# Patient Record
Sex: MALE | Race: WHITE | NOT HISPANIC OR LATINO | Employment: FULL TIME | URBAN - METROPOLITAN AREA
[De-identification: names, ages, dates, MRNs, and addresses within clinical notes are randomized per-mention and may not be internally consistent; named-entity substitution may affect disease eponyms.]

---

## 2018-03-09 ENCOUNTER — RADIANT APPOINTMENT (OUTPATIENT)
Dept: GENERAL RADIOLOGY | Facility: CLINIC | Age: 53
End: 2018-03-09
Attending: PHYSICIAN ASSISTANT
Payer: COMMERCIAL

## 2018-03-09 ENCOUNTER — OFFICE VISIT (OUTPATIENT)
Dept: URGENT CARE | Facility: URGENT CARE | Age: 53
End: 2018-03-09
Payer: COMMERCIAL

## 2018-03-09 VITALS
OXYGEN SATURATION: 98 % | DIASTOLIC BLOOD PRESSURE: 80 MMHG | SYSTOLIC BLOOD PRESSURE: 128 MMHG | HEART RATE: 77 BPM | TEMPERATURE: 97.6 F

## 2018-03-09 DIAGNOSIS — S63.501A WRIST SPRAIN, RIGHT, INITIAL ENCOUNTER: Primary | ICD-10-CM

## 2018-03-09 DIAGNOSIS — M25.531 RIGHT WRIST PAIN: ICD-10-CM

## 2018-03-09 PROCEDURE — 99203 OFFICE O/P NEW LOW 30 MIN: CPT | Performed by: PHYSICIAN ASSISTANT

## 2018-03-09 PROCEDURE — 73110 X-RAY EXAM OF WRIST: CPT | Mod: RT

## 2018-03-09 NOTE — PATIENT INSTRUCTIONS
Wrist Sprain  A sprain is an injury to the ligaments or capsule that holds a joint together. There are no broken bones. Most sprains take about 3 to 6 weeks to heal. If it a severe sprain where the ligament is completely torn, it can take months to recover.     Most wrist sprains are treated with a splint, wrist brace, or elastic wrap for support. Severe sprains may require surgery.  Home care    Keep your arm elevated to reduce pain and swelling. This is very important during the first 48 hours.    Apply an ice pack over the injured area for 15 to 20 minutes every 3 to 6 hours. You should do this for the first 24 to 48 hours. You can make an ice pack by filling a plastic bag that seals at the top with ice cubes and then wrapping it with a thin towel. Continue to use ice packs for relief of pain and swelling as needed. As the ice melts, be careful to avoid getting your wrap, splint, or cast wet. After 48 hours, apply heat (warm shower or warm bath) for 15 to 20 minutes several times a day, or alternate ice and heat.     You may use over-the-counter pain medicine to control pain, unless another pain medicine was prescribed. If you have chronic liver or kidney disease or ever had a stomach ulcer or GI bleeding, talk with your doctor before using these medicines.    If you were given a splint or brace, wear it for the time advised by your doctor.  Follow-up care  Follow up with your healthcare provider as advised. Any X-rays you had today don t show any broken bones, breaks, or fractures. Sometimes fractures don t show up on the first X-ray. Bruises and sprains can sometimes hurt as much as a fracture. These injuries can take time to heal completely. If your symptoms don t improve or they get worse, talk with your doctor. You may need a repeat X-ray. If X-rays were taken, you will be told of any new findings that may affect your care.  When to seek medical advice  Call your healthcare provider right away if any of  these occur:    Pain or swelling increases    Fingers or hand becomes cold, blue, numb, or tingly  Date Last Reviewed: 11/20/2015 2000-2017 [employer]. [address] [place] [address], [place] [address]. All rights reserved. This information is not intended as a substitute for professional medical care. Always follow your healthcare professional's instructions.

## 2018-03-09 NOTE — MR AVS SNAPSHOT
After Visit Summary   3/9/2018    Jf Bernardo    MRN: [id number]           Patient Information     Date Of Birth          1965        Visit Information        Provider Department      3/9/2018 4:55 PM Suzanne Weeks PA-C [facility]        Today's Diagnoses     Right wrist pain    -  1      Care Instructions      Wrist Sprain  A sprain is an injury to the ligaments or capsule that holds a joint together. There are no broken bones. Most sprains take about 3 to 6 weeks to heal. If it a severe sprain where the ligament is completely torn, it can take months to recover.     Most wrist sprains are treated with a splint, wrist brace, or elastic wrap for support. Severe sprains may require surgery.  Home care    Keep your arm elevated to reduce pain and swelling. This is very important during the first 48 hours.    Apply an ice pack over the injured area for 15 to 20 minutes every 3 to 6 hours. You should do this for the first 24 to 48 hours. You can make an ice pack by filling a plastic bag that seals at the top with ice cubes and then wrapping it with a thin towel. Continue to use ice packs for relief of pain and swelling as needed. As the ice melts, be careful to avoid getting your wrap, splint, or cast wet. After 48 hours, apply heat (warm shower or warm bath) for 15 to 20 minutes several times a day, or alternate ice and heat.     You may use over-the-counter pain medicine to control pain, unless another pain medicine was prescribed. If you have chronic liver or kidney disease or ever had a stomach ulcer or GI bleeding, talk with your doctor before using these medicines.    If you were given a splint or brace, wear it for the time advised by your doctor.  Follow-up care  Follow up with your healthcare provider as advised. Any X-rays you had today don t show any broken bones, breaks, or fractures. Sometimes fractures don t show up on the first X-ray. Bruises and sprains  "can sometimes hurt as much as a fracture. These injuries can take time to heal completely. If your symptoms don t improve or they get worse, talk with your doctor. You may need a repeat X-ray. If X-rays were taken, you will be told of any new findings that may affect your care.  When to seek medical advice  Call your healthcare provider right away if any of these occur:    Pain or swelling increases    Fingers or hand becomes cold, blue, numb, or tingly  Date Last Reviewed: 11/20/2015 2000-2017 [employer]. [address] [place] [address]. All rights reserved. This information is not intended as a substitute for professional medical care. Always follow your healthcare professional's instructions.                Follow-ups after your visit        Who to contact     If you have questions or need follow up information about today's clinic visit or your schedule please contact [facility] directly at [phone].  Normal or non-critical lab and imaging results will be communicated to you by MyChart, letter or phone within 4 business days after the clinic has received the results. If you do not hear from us within 7 days, please contact the clinic through [employer] or phone. If you have a critical or abnormal lab result, we will notify you by phone as soon as possible.  Submit refill requests through [employer] or call your pharmacy and they will forward the refill request to us. Please allow 3 business days for your refill to be completed.          Additional Information About Your Visit        [employer] Information     [employer] lets you send messages to your doctor, view your test results, renew your prescriptions, schedule appointments and more. To sign up, go to [email or web address] . Click on \"Log in\" on the left side of the screen, which will take you to the Welcome page. Then click on \"Sign up Now\" on the right side of the page.     You will be asked to enter the access code " listed below, as well as some personal information. Please follow the directions to create your username and password.     Your access code is: [email or web address]  Expires: 2018  5:34 PM     Your access code will  in 90 days. If you need help or a new code, please call your [facility] clinic or [phone].        Care EveryWhere ID     This is your Care EveryWhere ID. This could be used by other organizations to access your [facility] medical records  [id number]        Your Vitals Were     Pulse Temperature Pulse Oximetry             77 97.6  F (36.4  C) (Tympanic) 98%          Blood Pressure from Last 3 Encounters:   18 128/80    Weight from Last 3 Encounters:   No data found for Wt               Primary Care Provider Fax #    Physician No Ref-Primary [phone]       No address on file        Equal Access to Services     ZULEYKA ROCHE : Hadii cali barahona Sojane, waaxda luqadaha, qaybta kaalmada loki, kelvin marvin . So [facility] [phone].    ATENCIÓN: Si habla español, tiene a reynolds disposición servicios gratuitos de asistencia lingüística. Llame al [phone].    We comply with applicable federal civil rights laws and [place] laws. We do not discriminate on the basis of race, color, national origin, age, disability, sex, sexual orientation, or gender identity.            Thank you!     Thank you for choosing [facility]  for your care. Our goal is always to provide you with excellent care. Hearing back from our patients is one way we can continue to improve our services. Please take a few minutes to complete the written survey that you may receive in the mail after your visit with us. Thank you!             Your Updated Medication List - Protect others around you: Learn how to safely use, store and throw away your medicines at www.disposemymeds.org.      Notice  As of 3/9/2018  5:34 PM    You have not been prescribed any medications.

## 2018-03-09 NOTE — PROGRESS NOTES
SUBJECTIVE:   Jf Bernardo is a 52 year old male presenting with a chief complaint of   Chief Complaint   Patient presents with     Urgent Care     Musculoskeletal Problem     right wrist injury, slipped, very swollen       He is a new patient of [facility].    Onset of symptoms was today. He fell forward this AM.   Location: right wrist  Context:        The injury happened while while walking      Mechanism: fall       Patient experienced delayed pain, delayed swelling  Course of symptoms is worsening.    Severity moderate  Current and Associated symptoms: Pain and Swelling  Denies  Bruising, Warmth and Redness  Aggravating Factors: flexion/extension  Therapies to improve symptoms include: ice  This is the first time this type of problem has occurred for this patient.     Denies numbness and tingling into his fingers  Denies decrease in strength  Denies color change    Review of Systems   All other systems reviewed and are negative.      No past medical history on file.  No family history on file.  No current outpatient prescriptions on file.     Social History   Substance Use Topics     Smoking status: Not on file     Smokeless tobacco: Not on file     Alcohol use Not on file       OBJECTIVE  /80 (BP Location: Right arm, Patient Position: Chair, Cuff Size: Adult Regular)  Pulse 77  Temp 97.6  F (36.4  C) (Tympanic)  SpO2 98%    Physical Exam   Constitutional: He appears well-developed and well-nourished. No distress.   Musculoskeletal:        Right wrist: He exhibits decreased range of motion, tenderness, bony tenderness and swelling. He exhibits no effusion, no crepitus and no deformity.   CRT brisk   Neurological: He has normal strength. No sensory deficit.   Skin: He is not diaphoretic.       Labs:  No results found for this or any previous visit (from the past 24 hour(s)).    X-Ray was done, my findings are: Normal    ASSESSMENT:      ICD-10-CM    1. Right wrist pain M25.531 XR Wrist Right G/E 3  Views        Medical Decision Making:    Differential Diagnosis:  fracture, muscle strain and contusion      PLAN:    MS Injury/Pain  ice, rest, splint with velcro wrist splint, Tylenol and Ibuprofen    Followup:    If not improving or if condition worsens, follow up with your Primary Care Provider    Suzanne Weeks PA-C

## 2023-03-24 ENCOUNTER — APPOINTMENT (OUTPATIENT)
Dept: CARDIOLOGY | Facility: CLINIC | Age: 58
End: 2023-03-24
Attending: EMERGENCY MEDICINE
Payer: COMMERCIAL

## 2023-03-24 ENCOUNTER — TELEPHONE (OUTPATIENT)
Dept: CARDIOLOGY | Facility: CLINIC | Age: 58
End: 2023-03-24

## 2023-03-24 ENCOUNTER — HOSPITAL ENCOUNTER (EMERGENCY)
Facility: CLINIC | Age: 58
Discharge: HOME | End: 2023-03-24
Attending: EMERGENCY MEDICINE | Admitting: EMERGENCY MEDICINE
Payer: COMMERCIAL

## 2023-03-24 VITALS
SYSTOLIC BLOOD PRESSURE: 109 MMHG | BODY MASS INDEX: 26.94 KG/M2 | TEMPERATURE: 97.1 F | OXYGEN SATURATION: 96 % | WEIGHT: 192.46 LBS | HEART RATE: 74 BPM | DIASTOLIC BLOOD PRESSURE: 95 MMHG | RESPIRATION RATE: 16 BRPM | HEIGHT: 71 IN

## 2023-03-24 DIAGNOSIS — I48.91 ATRIAL FIBRILLATION WITH RVR (H): ICD-10-CM

## 2023-03-24 DIAGNOSIS — I48.91 ATRIAL FIBRILLATION (H): Primary | ICD-10-CM

## 2023-03-24 LAB
ANION GAP SERPL CALCULATED.3IONS-SCNC: 16 MMOL/L (ref 7–15)
ATRIAL RATE - MUSE: 89 BPM
BASOPHILS # BLD AUTO: 0 10E3/UL (ref 0–0.2)
BASOPHILS NFR BLD AUTO: 0 %
BUN SERPL-MCNC: 13.9 MG/DL (ref 6–20)
CALCIUM SERPL-MCNC: 10.2 MG/DL (ref 8.6–10)
CHLORIDE SERPL-SCNC: 99 MMOL/L (ref 98–107)
CREAT SERPL-MCNC: 0.91 MG/DL (ref 0.67–1.17)
DEPRECATED HCO3 PLAS-SCNC: 21 MMOL/L (ref 22–29)
DIASTOLIC BLOOD PRESSURE - MUSE: NORMAL MMHG
EOSINOPHIL # BLD AUTO: 0 10E3/UL (ref 0–0.7)
EOSINOPHIL NFR BLD AUTO: 1 %
ERYTHROCYTE [DISTWIDTH] IN BLOOD BY AUTOMATED COUNT: 11.9 % (ref 10–15)
GFR SERPL CREATININE-BSD FRML MDRD: >90 ML/MIN/1.73M2
GLUCOSE SERPL-MCNC: 142 MG/DL (ref 70–99)
HCT VFR BLD AUTO: 51.4 % (ref 40–53)
HGB BLD-MCNC: 18.4 G/DL (ref 13.3–17.7)
IMM GRANULOCYTES # BLD: 0 10E3/UL
IMM GRANULOCYTES NFR BLD: 0 %
INTERPRETATION ECG - MUSE: NORMAL
LVEF ECHO: NORMAL
LYMPHOCYTES # BLD AUTO: 1.3 10E3/UL (ref 0.8–5.3)
LYMPHOCYTES NFR BLD AUTO: 23 %
MAGNESIUM SERPL-MCNC: 1.8 MG/DL (ref 1.7–2.3)
MCH RBC QN AUTO: 29.5 PG (ref 26.5–33)
MCHC RBC AUTO-ENTMCNC: 35.8 G/DL (ref 31.5–36.5)
MCV RBC AUTO: 82 FL (ref 78–100)
MONOCYTES # BLD AUTO: 0.4 10E3/UL (ref 0–1.3)
MONOCYTES NFR BLD AUTO: 7 %
NEUTROPHILS # BLD AUTO: 3.8 10E3/UL (ref 1.6–8.3)
NEUTROPHILS NFR BLD AUTO: 69 %
NRBC # BLD AUTO: 0 10E3/UL
NRBC BLD AUTO-RTO: 0 /100
P AXIS - MUSE: NORMAL DEGREES
PLATELET # BLD AUTO: 173 10E3/UL (ref 150–450)
POTASSIUM SERPL-SCNC: 4.2 MMOL/L (ref 3.4–5.3)
PR INTERVAL - MUSE: NORMAL MS
QRS DURATION - MUSE: 84 MS
QT - MUSE: 336 MS
QTC - MUSE: 458 MS
R AXIS - MUSE: 19 DEGREES
RBC # BLD AUTO: 6.24 10E6/UL (ref 4.4–5.9)
SODIUM SERPL-SCNC: 136 MMOL/L (ref 136–145)
SYSTOLIC BLOOD PRESSURE - MUSE: NORMAL MMHG
T AXIS - MUSE: 41 DEGREES
TROPONIN T SERPL HS-MCNC: 8 NG/L
TSH SERPL DL<=0.005 MIU/L-ACNC: 1.73 UIU/ML (ref 0.3–4.2)
VENTRICULAR RATE- MUSE: 112 BPM
WBC # BLD AUTO: 5.5 10E3/UL (ref 4–11)

## 2023-03-24 PROCEDURE — 93321 DOPPLER ECHO F-UP/LMTD STD: CPT | Mod: 26 | Performed by: INTERNAL MEDICINE

## 2023-03-24 PROCEDURE — 80048 BASIC METABOLIC PNL TOTAL CA: CPT | Performed by: EMERGENCY MEDICINE

## 2023-03-24 PROCEDURE — 96367 TX/PROPH/DG ADDL SEQ IV INF: CPT

## 2023-03-24 PROCEDURE — 96365 THER/PROPH/DIAG IV INF INIT: CPT

## 2023-03-24 PROCEDURE — 84484 ASSAY OF TROPONIN QUANT: CPT | Performed by: EMERGENCY MEDICINE

## 2023-03-24 PROCEDURE — 96366 THER/PROPH/DIAG IV INF ADDON: CPT | Mod: 59

## 2023-03-24 PROCEDURE — 93325 DOPPLER ECHO COLOR FLOW MAPG: CPT | Mod: 26 | Performed by: INTERNAL MEDICINE

## 2023-03-24 PROCEDURE — 93308 TTE F-UP OR LMTD: CPT

## 2023-03-24 PROCEDURE — 250N000013 HC RX MED GY IP 250 OP 250 PS 637: Performed by: EMERGENCY MEDICINE

## 2023-03-24 PROCEDURE — 99204 OFFICE O/P NEW MOD 45 MIN: CPT | Performed by: INTERNAL MEDICINE

## 2023-03-24 PROCEDURE — 93005 ELECTROCARDIOGRAM TRACING: CPT | Mod: XU

## 2023-03-24 PROCEDURE — 99152 MOD SED SAME PHYS/QHP 5/>YRS: CPT

## 2023-03-24 PROCEDURE — 84443 ASSAY THYROID STIM HORMONE: CPT | Performed by: EMERGENCY MEDICINE

## 2023-03-24 PROCEDURE — 36415 COLL VENOUS BLD VENIPUNCTURE: CPT | Performed by: EMERGENCY MEDICINE

## 2023-03-24 PROCEDURE — 93308 TTE F-UP OR LMTD: CPT | Mod: 26 | Performed by: INTERNAL MEDICINE

## 2023-03-24 PROCEDURE — 250N000011 HC RX IP 250 OP 636: Performed by: EMERGENCY MEDICINE

## 2023-03-24 PROCEDURE — 83735 ASSAY OF MAGNESIUM: CPT | Performed by: EMERGENCY MEDICINE

## 2023-03-24 PROCEDURE — 85025 COMPLETE CBC W/AUTO DIFF WBC: CPT | Performed by: EMERGENCY MEDICINE

## 2023-03-24 PROCEDURE — 93325 DOPPLER ECHO COLOR FLOW MAPG: CPT

## 2023-03-24 PROCEDURE — 96375 TX/PRO/DX INJ NEW DRUG ADDON: CPT

## 2023-03-24 PROCEDURE — 99285 EMERGENCY DEPT VISIT HI MDM: CPT | Mod: 25

## 2023-03-24 PROCEDURE — 92960 CARDIOVERSION ELECTRIC EXT: CPT

## 2023-03-24 RX ORDER — SODIUM CHLORIDE, SODIUM LACTATE, POTASSIUM CHLORIDE, CALCIUM CHLORIDE 600; 310; 30; 20 MG/100ML; MG/100ML; MG/100ML; MG/100ML
INJECTION, SOLUTION INTRAVENOUS ONCE
Status: DISCONTINUED | OUTPATIENT
Start: 2023-03-24 | End: 2023-03-24 | Stop reason: HOSPADM

## 2023-03-24 RX ORDER — PROPOFOL 10 MG/ML
1 INJECTION, EMULSION INTRAVENOUS ONCE
Status: COMPLETED | OUTPATIENT
Start: 2023-03-24 | End: 2023-03-24

## 2023-03-24 RX ORDER — METOPROLOL TARTRATE 25 MG/1
25 TABLET, FILM COATED ORAL ONCE
Status: COMPLETED | OUTPATIENT
Start: 2023-03-24 | End: 2023-03-24

## 2023-03-24 RX ORDER — ASPIRIN 81 MG/1
324 TABLET, CHEWABLE ORAL ONCE
Status: COMPLETED | OUTPATIENT
Start: 2023-03-24 | End: 2023-03-24

## 2023-03-24 RX ORDER — MAGNESIUM SULFATE HEPTAHYDRATE 40 MG/ML
2 INJECTION, SOLUTION INTRAVENOUS ONCE
Status: COMPLETED | OUTPATIENT
Start: 2023-03-24 | End: 2023-03-24

## 2023-03-24 RX ORDER — METOPROLOL TARTRATE 25 MG/1
25 TABLET, FILM COATED ORAL 2 TIMES DAILY
Qty: 60 TABLET | Refills: 0 | Status: SHIPPED | OUTPATIENT
Start: 2023-03-24 | End: 2023-04-17

## 2023-03-24 RX ORDER — DILTIAZEM HYDROCHLORIDE 5 MG/ML
20 INJECTION INTRAVENOUS ONCE
Status: COMPLETED | OUTPATIENT
Start: 2023-03-24 | End: 2023-03-24

## 2023-03-24 RX ADMIN — PROPOFOL 100 MG: 10 INJECTION, EMULSION INTRAVENOUS at 15:42

## 2023-03-24 RX ADMIN — ASPIRIN 81 MG 324 MG: 81 TABLET ORAL at 12:36

## 2023-03-24 RX ADMIN — MAGNESIUM SULFATE HEPTAHYDRATE 2 G: 2 INJECTION, SOLUTION INTRAVENOUS at 13:40

## 2023-03-24 RX ADMIN — DILTIAZEM HYDROCHLORIDE 20 MG: 5 INJECTION, SOLUTION INTRAVENOUS at 12:36

## 2023-03-24 RX ADMIN — METOPROLOL TARTRATE 25 MG: 25 TABLET, FILM COATED ORAL at 12:34

## 2023-03-24 RX ADMIN — AMIODARONE HYDROCHLORIDE 150 MG: 1.5 INJECTION, SOLUTION INTRAVENOUS at 13:18

## 2023-03-24 ASSESSMENT — ACTIVITIES OF DAILY LIVING (ADL)
ADLS_ACUITY_SCORE: 35

## 2023-03-24 ASSESSMENT — ENCOUNTER SYMPTOMS
LIGHT-HEADEDNESS: 1
DIZZINESS: 1
SHORTNESS OF BREATH: 1
DIAPHORESIS: 1
WEAKNESS: 1

## 2023-03-24 NOTE — TELEPHONE ENCOUNTER
Future Appointments   Date Time Provider Department Center   4/17/2023  1:00 PM [facility]  03/24/23 at 2:35 PM

## 2023-03-24 NOTE — ED PROVIDER NOTES
"  History     Chief Complaint:  Atrial Fib       The history is provided by the patient.      Jf Bernardo is a 57 year old male who presents with atrial fibrillation. Patient reports that he [place] way [place] the airport with [place] around 1015. He started feeling lightheaded and dizzy, associated with difficulty breathing, sweating, weakness, and vision change. His wife called EMS where they found he was in Afib and recommended him to come to the ED. Patient has never experienced this before. Patient is otherwise fit and healthy.     Independent Historian:   None - Patient Only     Review of External Notes: none     ROS:  Review of Systems   Constitutional: Positive for diaphoresis.   Eyes: Positive for visual disturbance.   Respiratory: Positive for shortness of breath.    Neurological: Positive for dizziness, weakness and light-headedness.   All other systems reviewed and are negative.    Allergies:  No Known Allergies     Medications:    The patient is not currently taking any prescribed medications.    Past Medical History:    Patient denies past medical history.     Past Surgical History:    Patient denies past surgical history.      Social History:  Presents with wife.  Presents via private vehicle .   PCP: No Ref-Primary, Physician     Physical Exam     Patient Vitals for the past 24 hrs:   BP Temp Temp src Pulse Resp SpO2 Height Weight   03/24/23 1545 (!) 109/95 -- -- 74 16 96 % -- --   03/24/23 1540 115/82 -- -- 68 11 96 % -- --   03/24/23 1535 119/85 -- -- -- 18 -- -- --   03/24/23 1530 108/73 -- -- 92 24 94 % -- --   03/24/23 1525 113/79 -- -- 71 -- -- -- --   03/24/23 1431 -- -- -- -- -- -- 1.803 m (5' 11\") 87.3 kg (192 lb 7.4 oz)   03/24/23 1301 -- -- -- 76 (!) 50 -- -- --   03/24/23 1256 123/77 -- -- 73 (!) 43 -- -- --   03/24/23 1251 109/76 -- -- 65 (!) 6 -- -- --   03/24/23 1246 92/72 -- -- 73 (!) 7 -- -- --   03/24/23 1241 100/78 -- -- 79 11 -- -- --   03/24/23 1236 110/74 -- -- 100 16 -- " -- --   03/24/23 1149 93/72 -- -- -- -- -- -- --   03/24/23 1147 -- 97.1  F (36.2  C) Temporal 108 20 100 % -- --        Physical Exam  Nursing note and vitals reviewed.  HENT:   Mouth/Throat: Moist mucous membranes.   Eyes: EOMI, nonicteric sclera  Cardiovascular: tachycardic, irregularly irregular rhythm, no murmurs, rubs, or gallops  Pulmonary/Chest: Effort normal and breath sounds normal. No respiratory distress. No wheezes. No rales.   Musculoskeletal: Normal range of motion.   Neurological: Alert. Moves all extremities spontaneously.   Skin: Skin is warm and dry. No rash noted.     Emergency Department Course   ECG  ECG taken at 1154, ECG read at 1205  Atrial fibrillation with rapid ventricular response  Abnormal ECG   No prior EKG to compare.   Rate 112 bpm. MD interval * ms. QRS duration 84 ms. QT/QTc 336/458 ms. P-R-T axes * 19 41.     Imaging:  Echocardiogram Limited   Final Result         Report per cardiology    Laboratory:  Labs Ordered and Resulted from Time of ED Arrival to Time of ED Departure   BASIC METABOLIC PANEL - Abnormal       Result Value    Sodium 136      Potassium 4.2      Chloride 99      Carbon Dioxide (CO2) 21 (*)     Anion Gap 16 (*)     Urea Nitrogen 13.9      Creatinine 0.91      Calcium 10.2 (*)     Glucose 142 (*)     GFR Estimate >90     CBC WITH PLATELETS AND DIFFERENTIAL - Abnormal    WBC Count 5.5      RBC Count 6.24 (*)     Hemoglobin 18.4 (*)     Hematocrit 51.4      MCV 82      MCH 29.5      MCHC 35.8      RDW 11.9      Platelet Count 173      % Neutrophils 69      % Lymphocytes 23      % Monocytes 7      % Eosinophils 1      % Basophils 0      % Immature Granulocytes 0      NRBCs per 100 WBC 0      Absolute Neutrophils 3.8      Absolute Lymphocytes 1.3      Absolute Monocytes 0.4      Absolute Eosinophils 0.0      Absolute Basophils 0.0      Absolute Immature Granulocytes 0.0      Absolute NRBCs 0.0     TROPONIN T, HIGH SENSITIVITY - Normal    Troponin T, High Sensitivity 8      TSH WITH FREE T4 REFLEX - Normal    TSH 1.73     MAGNESIUM - Normal    Magnesium 1.8          Procedures   Electrical Cardioversion         Procedure: Electrical Cardioversion        Indication: Atrial Fibrillation     Consent: Written from Patient     Universal Protocol: Universal protocol was followed and time out conducted just prior to starting procedure, confirming patient identity, site/side, procedure, patient position, and availability of correct equipment and implants.       Anesthesia/Sedation: Sedation: The patient was sedated, see separate procedure note for details.      Procedure Detail:   Electrodes were placed: Anterior/posterior  Trial #1: Synchronized Cardioversion at 120 Joules     Cardioversion was successful     Patient Status:  The patient tolerated the procedure well: Yes. There were no complications.        Sedation     Procedure: Procedural Sedation    Sedation Level: Moderate    Indication: Cardioversion    Consent: Written from Patient     Universal protocol: Universal protocol was followed and time out conducted just prior to starting procedure, confirming patient identity, site/side, procedure, patient position, and availability of correct equipment and implants.     Last PO Intake: 5+ hours    ASA Class: Class 1 - A normal healthy patient     Exam:  Mallampati:  Grade 1 - Soft palate, uvula, and pillars visible    Lungs: Clear   Heart: mild tachycardia, irregular rhythm (normal rate at time of cardioversion)    Medication: Propofol  Dose: 100 mg     Monitoring:  Monitoring consisted of heart rate, cardiac, continuous pulse oximetry, frequent blood pressure checks.   There was constant attendance by RN until patient recovered and constant attendance by physician until patient stable.   Intubation and emergency airway equipment available.     Response: Vital signs stable, oxygen saturations greater than 92%       Patient Status: Post procedure patient was alert.     Total Physician  Drug Administration / Monitoring Time: 10 minutes.     Patient was monitored during recovery and returned to pre-procedure baseline.       Emergency Department Course & Assessments:     Interventions:  Medications   lactated ringers BOLUS 500 mL (has no administration in time range)   lactated ringers infusion (has no administration in time range)   diltiazem (CARDIZEM) injection 20 mg (20 mg Intravenous $Given 3/24/23 1236)   metoprolol tartrate (LOPRESSOR) tablet 25 mg (25 mg Oral $Given 3/24/23 1234)   aspirin (ASA) chewable tablet 324 mg (324 mg Oral $Given 3/24/23 1236)   amiodarone (NEXTERONE) bolus 150 mg (0 mg Intravenous Stopped 3/24/23 1334)   magnesium sulfate 2 g in 50 mL sterile water intermittent infusion (0 g Intravenous Stopped 3/24/23 1619)   propofol (DIPRIVAN) injection 10 mg/mL vial (100 mg Intravenous $Given 3/24/23 1542)        Independent Interpretation (X-rays, CTs, rhythm strip):  None    Assessments and Consultations/Discussion of Management or Tests:  ED Course as of 03/24/23 1641   Fri Mar 24, 2023   1211 I examined and obtained patient's history.    1231 I consulted with Dr. Ap Weldon, Cardiology, about patient's history.    1535 I performed cardioversion on patient.        Social Determinants of Health affecting care:   None    Disposition:  The patient was discharged to home.     Impression & Plan        Medical Decision Making:  Pt presents for evaluation of palpitations, onset earlier this AM with very clear onset.  This is consistent with atrial fibrillation with rapid ventricular response.  Based on acute symptoms less than 48 hours, very clear story, good historian and after obtaining informed consent, electrical cardioversion was successful in converting rhythm back to normal sinus.  Did discuss case with cardiology, Dr. Weldon, who did see patient and agreed with cardioversion.  Also obtained a stat echo which was reassuring.  I doubt acute coronary syndrome, thyroid  issues, PE, dissection, drug ingestion, acute electrolyte imbalance, etc.  Labs ok. Repeat EKG looks excellent. He is asymptomatic after cardioversion now and would not hospitalize. Discussed with patient and he is in agreement. Will start metoprolol and eliquis x 1 month per cardiology instructions. He has an appt scheduled in 3 weeks. He is in stable condition at the time of discharge, indications for return to the ED were discussed as well as follow up. All questions were answered and he and his wife are in agreement with the plan.        Diagnosis:    ICD-10-CM    1. Atrial fibrillation with RVR (H)  I48.91            Discharge Medications:  New Prescriptions    APIXABAN ANTICOAGULANT (ELIQUIS ANTICOAGULANT) 5 MG TABLET    Take 1 tablet (5 mg) by mouth 2 times daily for 30 days    METOPROLOL TARTRATE (LOPRESSOR) 25 MG TABLET    Take 1 tablet (25 mg) by mouth 2 times daily for 30 days          Scribe Disclosure:  Kelsey SIDDIQUI, am serving as a scribe at 12:06 PM on 3/24/2023 to document services personally performed by Nickolas Blackwood MD based on my observations and the provider's statements to me.        Nickolas Blackwood MD  03/25/23 1790

## 2023-03-24 NOTE — PROGRESS NOTES
"         [facility]  Respiratory Care Note    An ETCO2 monitor was placed on the pt with 3 LPM bled in. The Ambu bag, suction, and airways were setup and present in the room, but not needed. Pt was able to maintain airway throughout the procedure with no intervention needed ETCO2 levels were maintained between 34-38, HR 85-67, RR 27-25. Will continue to monitor and assess the pt's current respiratory status and needs.     Vital signs:  Temp: 97.1  F (36.2  C) Temp src: Temporal BP: (!) 109/95 Pulse: 74   Resp: 16 SpO2: 96 % O2 Device: None (Room air) Oxygen Delivery: 2 LPM Height: 180.3 cm (5' 11\") Weight: 87.3 kg (192 lb 7.4 oz)  Estimated body mass index is 26.84 kg/m  as calculated from the following:    Height as of this encounter: 1.803 m (5' 11\").    Weight as of this encounter: 87.3 kg (192 lb 7.4 oz).      Faisal Machuca RT  [facility]  3/24/2023      "

## 2023-03-24 NOTE — ED NOTES
Cardiac (Adult) Cardiac WDL:  (pt comes in with chest pressure, fluttering feeling in chest, diaphoretic, SOB)

## 2023-03-24 NOTE — TELEPHONE ENCOUNTER
----- Message from Darryl Weldon MD sent at 3/24/2023  1:52 PM CDT -----  Regarding: ED follow up  I saw this patient in the ER for A-fib, he is undergoing a cardioversion and should be discharged home.  Can you arrange follow-up in about 3 to 4 weeks?    Ap

## 2023-03-24 NOTE — LETTER
March 24, 2023      To Whom It May Concern:      Jf Bernardo was seen in our Emergency Department today, 03/24/23. Pt was suffering a medical emergency that caused him to miss/change his flight. He is now safe to fly.     Sincerely,        Nickolas Blackwood MD

## 2023-03-24 NOTE — ED TRIAGE NOTES
x1 hour. Dizzy, covered in sweat, weakness. Called EMS. They stated he was in A-Fib, which is new for him.

## 2023-03-24 NOTE — CONSULTS
[facility]    CARDIOLOGY CONSULT    Date of Admission:  3/24/2023  Date of Consult: March 24, 2023    ASSESSMENT:  57-year-old male seen for new onset atrial fibrillation.  Symptoms started at 10 AM this morning, 2 hours before ED presentation.  Troponin is negative, EKG showed no ischemic changes.  There is no obvious provoking factor for his A-fib.  We talked about the treatment options.  Since his onset was only a few hours ago, he is agreeable to a cardioversion.  This will be done in the emergency department.  He has already been given a dose of IV amiodarone and metoprolol.    He should have at least 30 days of anticoagulation after cardioversion, even though his [id number] score is very low.    RECOMMENDATIONS:  1.  New onset A-fib  -Recommend cardioversion in the ED, discharged home if stable afterward  -Recommend discharge with Eliquis and metoprolol    Will arrange cardiology follow-up in 3 to 4 weeks.    Darryl Weldon MD  Cardiology - [facility]  Pager:  [phone]  Text Page  March 24, 2023    CODE STATUS:  No Order    REASON FOR CONSULT: afib    PRIMARY CARE PHYSICIAN:  Physician No Ref-Primary    HISTORY OF PRESENT ILLNESS:  57 year old male seen for afib.  He has no cardiac history.    At baseline he is quite active.  He does regular exercise and denies any shortness of breath, dyspnea, or other symptoms.  He denies any palpitations.    Around 10 AM this morning, he had onset of palpitations, mild chest discomfort, and felt a little diaphoretic.  He presented to the ED.  He was found to be in A-fib with heart rate of 110.  EKG showed no ischemic changes.  He had no ongoing chest pain.    At the time of interview he is still in rate controlled A-fib with heart rate of 70.  He is resting very comfortably in bed.  He otherwise denies any recent illnesses.  He denies any drug use or other substances.    PAST MEDICAL HISTORY:  No significant past medical history    HOME  MEDICATIONS:  Prior to Admission Medications   Prescriptions Last Dose Informant Patient Reported? Taking?   order for DME   No No   Sig: Equipment being ordered: wrist splint      Facility-Administered Medications: None       ALLERGIES:  No Known Allergies    SOCIAL HISTORY:  Social alcohol, non-smoker.    FAMILY HISTORY:  No premature CAD.    REVIEW OF SYSTEMS:  Constitutional:  No weight loss, fever, chills  HEENT:  Eyes:  No visual loss, blurred vision, double vision or yellow sclerae. No hearing loss, sneezing, congestion, runny nose or sore throat.  Skin:  No rash or itching.  Cardiovascular: per HPI  Respiratory: per HPI  GI:  No anorexia, nausea, vomiting or diarrhea. No abdominal pain or blood.  :  No dysurea, hematuria  Neurologic:  No headache, paralysis, ataxia, numbness or tingling in the extremities. No change in bowel or bladder control.  Musculoskeletal:  No muscle pain  Hematologic:  No bleeding or bruising.  Lymphatics:  No enlarged nodes. No history of splenectomy.  Endocrine:  No reports of sweating, cold or heat intolerance. No polyuria or polydipsia.  Allergies:  No history of asthma, hives, eczema or rhinitis.    PHYSICAL EXAM:  Temp: 97.1  F (36.2  C) Temp src: Temporal BP: 93/72 Pulse: 108   Resp: 20 SpO2: 100 %      Vital Signs with Ranges  Temp:  [97.1  F (36.2  C)] 97.1  F (36.2  C)  Pulse:  [108] 108  Resp:  [20] 20  BP: (93)/(72) 93/72  SpO2:  [100 %] 100 %  0 lbs 0 oz    Constitutional: awake, alert, no distress  Eyes: PERRL, sclera nonicteric  ENT: trachea midline  Respiratory: Lungs clear  Cardiovascular: Regular rate, irregular rhythm, no murmurs  GI: nondistended, nontender, bowel sounds present  Lymph/Hematologic: no lymphadenopathy  Skin: dry, no rash  Musculoskeletal: good muscle tone, strength 5/5 in upper and lower extremities  Neurologic: no focal deficits  Neuropsychiatric: appropriate affact    No intake or output data in the 24 hours ending 03/24/23 1240    Clinically  Significant Risk Factors Present on Admission                    Cardiovascular: Cardiac Arrhythmia: Atrial fibrillation: Paroxysmal    DATA:  Labs: WBC 5, hemoglobin 18, platelets 173, potassium 4.2, creatinine 0.9, troponin 8, TSH 1.7    EKG: March 24: A-fib, rate 112, no ST elevation or depression    Tele: A-fib, rate 70s    Echo: March 24: EF 60%, normal wall motion, no significant valve disease

## 2023-03-27 LAB
ATRIAL RATE - MUSE: 78 BPM
DIASTOLIC BLOOD PRESSURE - MUSE: NORMAL MMHG
INTERPRETATION ECG - MUSE: NORMAL
P AXIS - MUSE: 21 DEGREES
PR INTERVAL - MUSE: 140 MS
QRS DURATION - MUSE: 80 MS
QT - MUSE: 382 MS
QTC - MUSE: 435 MS
R AXIS - MUSE: -29 DEGREES
SYSTOLIC BLOOD PRESSURE - MUSE: NORMAL MMHG
T AXIS - MUSE: 13 DEGREES
VENTRICULAR RATE- MUSE: 78 BPM

## 2023-04-17 ENCOUNTER — OFFICE VISIT (OUTPATIENT)
Dept: CARDIOLOGY | Facility: CLINIC | Age: 58
End: 2023-04-17
Payer: COMMERCIAL

## 2023-04-17 VITALS
HEIGHT: 71 IN | DIASTOLIC BLOOD PRESSURE: 80 MMHG | BODY MASS INDEX: 26.74 KG/M2 | HEART RATE: 72 BPM | WEIGHT: 191 LBS | SYSTOLIC BLOOD PRESSURE: 130 MMHG

## 2023-04-17 DIAGNOSIS — I48.91 ATRIAL FIBRILLATION, UNSPECIFIED TYPE (H): ICD-10-CM

## 2023-04-17 DIAGNOSIS — Z13.6 ENCOUNTER FOR SCREENING FOR CARDIOVASCULAR DISORDERS: Primary | ICD-10-CM

## 2023-04-17 PROCEDURE — 93000 ELECTROCARDIOGRAM COMPLETE: CPT | Performed by: PHYSICIAN ASSISTANT

## 2023-04-17 PROCEDURE — 99214 OFFICE O/P EST MOD 30 MIN: CPT | Performed by: PHYSICIAN ASSISTANT

## 2023-04-17 NOTE — PATIENT INSTRUCTIONS
Visit Summary:    Today we discussed:     Medication changes:    STOP the metoprolol  FINISH your current prescription for Eliquis then stop that as well.    Continue to avoid excess caffeine or alcohol.  You may resume your usual activity.     Follow up:   With Dr. Weldon in 3 months, but please call sooner with any new concerns.     Please call my nurse Cieol at [phone] with any questions or concerns.

## 2023-04-17 NOTE — PROGRESS NOTES
Cardiology Progress Note    Date of Service: 04/17/23      Reason for visit: ER follow up, new onset atrial fibrillation.    Primary cardiologist: Dr. Darryl Weldon      HPI:  Mr. 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 He was seen in the ER a few weeks ago after abruptly developing developing dizziness, dyspnea, sweating, and visual changes while on his way to the airport with his wife. EMS was called and he was found to have new onset atrial fibrillation (initially rapid with a HR of 112). Troponin was negative with no ischemic EKG changes and no obvious provoking factor for his afib. He was given a dose of IV amiodarone and metoprolol, and underwent successful cardioversion back to sinus rhythm while in the ER. He was discharged home on metoprolol and Eliquis.    Today, I am meeting Jf in clinic for follow up. He tells me that since discharge, he has not had recurrence of these symptoms; however, he reports overall feeling very poorly on the metoprolol. After taking it, for the next hour or two, he gets sweaty and significantly tired to the point where he ends up mostly sitting around, which is understandably quite distressing to him, as he very much loves to run. He denies any new chest pain, palpitations, or LE edema.     Most recent labs performed were reviewed as below.        ASSESSMENT/PLAN:    1. New diagnosis atrial fibrillation.   --As outlined above, developed abrupt onset of new symptoms, and found to be in afib with RVR. Unknown precipitating factor, though upon further discussion he reports having a lot more caffeine than usual as he felt stressed/fatigue while not being able to run recently due to a prior leg injury. He was successfully cardioverted back to sinus rhythm in the ER and discharged home. Currently, he remains in sinus rhythm per EKG with a HR of 65.    --Will stop the metoprolol today; this  "sounds to be causing him a lot of fatigue and regular exertion intolerance significantly less than he is used to. His qualify of life very much revolves around his ability to return to his long distance running. I told him he may slowly ease back into his usual activities and monitor for any recurrence of symptoms. Advised he continue to avoid excess caffeine intake.   --Will have him complete his 30 day prescription for Eliquis and then he may discontinue. [id number] score is zero.     Follow up plan: Return in 3 months for follow up in clinic, but I asked him to return sooner if new concerns/recurrence of symptoms.       Orders this Visit:  Orders Placed This Encounter   Procedures     Follow-Up with Cardiology     EKG 12-lead complete w/read - Clinics (performed today)     No orders of the defined types were placed in this encounter.    Medications Discontinued During This Encounter   Medication Reason     metoprolol tartrate (LOPRESSOR) 25 MG tablet Stop at Discharge     apixaban ANTICOAGULANT (ELIQUIS ANTICOAGULANT) 5 MG tablet Therapy completed (No AVS)           CURRENT MEDICATIONS:  Current Outpatient Medications   Medication Sig Dispense Refill     order for DME Equipment being ordered: wrist splint 1 Device 0       ALLERGIES   No Known Allergies    PAST MEDICAL HISTORY:  No past medical history on file.      Review of Systems:  POS ROS ARE BOLDED, all other negative.    Cardiovascular: Chest pain, palpitations, orthopnea, LE edema  Constitutional: New chills, fevers. Fatigue/sweaty.  Resp: Dyspnea at rest, dyspnea on exertion, known chronic lung disease  Hematologic/lymphatic: Current systemic anticoagulation, hx of blood clots, new bleeding concerns.  Neurological: New seizures, syncope/presyncope     Physical Exam:  Vitals: /80   Pulse 72   Ht 1.803 m (5' 11\")   Wt 86.6 kg (191 lb)   BMI 26.64 kg/m     Wt Readings from Last 4 Encounters:   04/17/23 86.6 kg (191 lb)   03/24/23 87.3 kg (192 lb " 7.4 oz)       GEN:  In general, this is a well nourished male in no acute distress on RA.  Patient ambulatory, accompanied by his wife.  C/V:  Regular rate and rhythm, no murmur, rub or gallop. No S3 or RV heave.   RESP: Respirations are unlabored. No use of accessory muscles. Clear to auscultation bilaterally without wheezing, rales, or rhonchi.  EXTREM: No LE edema.   NEURO: Alert and oriented, cooperative. Gait not assessed.     Recent Lab Results:    CBC RESULTS:  Lab Results   Component Value Date    WBC 5.5 03/24/2023    RBC 6.24 (H) 03/24/2023    HGB 18.4 (H) 03/24/2023    HCT 51.4 03/24/2023    MCV 82 03/24/2023    MCH 29.5 03/24/2023    MCHC 35.8 03/24/2023    RDW 11.9 03/24/2023     03/24/2023       BMP RESULTS:  Lab Results   Component Value Date     03/24/2023    POTASSIUM 4.2 03/24/2023    CHLORIDE 99 03/24/2023    CO2 21 (L) 03/24/2023    ANIONGAP 16 (H) 03/24/2023     (H) 03/24/2023    BUN 13.9 03/24/2023    CR 0.91 03/24/2023    GFRESTIMATED >90 03/24/2023    DMITRIY 10.2 (H) 03/24/2023        Additional pertinent testing:    Echo 3/24/23  Interpretation Summary     Left ventricular systolic function is normal. The visual ejection fraction is  60-65%.  The right ventricle is normal in structure, function and size.  There is trace tricuspid regurgitation. There is a small filamentous mobile  echodensity below the tricuspid valve, suspect torn chordae tendineae (image  18).  Trivial anterior pericardial effusion. There are no echocardiographic  indications of cardiac tamponade/hemodynamic significance.  The inferior vena cava was normal in size with preserved respiratory  variability.     There are no prior studies available for comparison      30 total minutes was spent today including chart review, precharting, history and exam, patient education, post visit documentation, and reviewing studies as outlined above.       Janel Oliveira PA-C  [facility]  Pager [phone]

## 2023-04-17 NOTE — LETTER
4/17/2023    Physician No Ref-Primary  No address on file    RE: Jf Bernardo       Dear Colleague,     I had the pleasure of seeing Jf Bernardo in the [facility].        Cardiology Progress Note    Date of Service: 04/17/23      Reason for visit: ER follow up, new onset atrial fibrillation.    Primary cardiologist: Dr. Darryl Weldon      HPI:  Mr. 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 He was seen in the ER a few weeks ago after abruptly developing developing dizziness, dyspnea, sweating, and visual changes while on his way to the airport with his wife. EMS was called and he was found to have new onset atrial fibrillation (initially rapid with a HR of 112). Troponin was negative with no ischemic EKG changes and no obvious provoking factor for his afib. He was given a dose of IV amiodarone and metoprolol, and underwent successful cardioversion back to sinus rhythm while in the ER. He was discharged home on metoprolol and Eliquis.    Today, I am meeting Jf in clinic for follow up. He tells me that since discharge, he has not had recurrence of these symptoms; however, he reports overall feeling very poorly on the metoprolol. After taking it, for the next hour or two, he gets sweaty and significantly tired to the point where he ends up mostly sitting around, which is understandably quite distressing to him, as he very much loves to run. He denies any new chest pain, palpitations, or LE edema.     Most recent labs performed were reviewed as below.        ASSESSMENT/PLAN:    1. New diagnosis atrial fibrillation.   --As outlined above, developed abrupt onset of new symptoms, and found to be in afib with RVR. Unknown precipitating factor, though upon further discussion he reports having a lot more caffeine than usual as he felt stressed/fatigue while not being able to run recently due to a prior leg  injury. He was successfully cardioverted back to sinus rhythm in the ER and discharged home. Currently, he remains in sinus rhythm per EKG with a HR of 65.    --Will stop the metoprolol today; this sounds to be causing him a lot of fatigue and regular exertion intolerance significantly less than he is used to. His qualify of life very much revolves around his ability to return to his long distance running. I told him he may slowly ease back into his usual activities and monitor for any recurrence of symptoms. Advised he continue to avoid excess caffeine intake.   --Will have him complete his 30 day prescription for Eliquis and then he may discontinue. [id number] score is zero.     Follow up plan: Return in 3 months for follow up in clinic, but I asked him to return sooner if new concerns/recurrence of symptoms.       Orders this Visit:  Orders Placed This Encounter   Procedures    Follow-Up with Cardiology    EKG 12-lead complete w/read - Clinics (performed today)     No orders of the defined types were placed in this encounter.    Medications Discontinued During This Encounter   Medication Reason    metoprolol tartrate (LOPRESSOR) 25 MG tablet Stop at Discharge    apixaban ANTICOAGULANT (ELIQUIS ANTICOAGULANT) 5 MG tablet Therapy completed (No AVS)           CURRENT MEDICATIONS:  Current Outpatient Medications   Medication Sig Dispense Refill    order for DME Equipment being ordered: wrist splint 1 Device 0       ALLERGIES   No Known Allergies    PAST MEDICAL HISTORY:  No past medical history on file.      Review of Systems:  POS ROS ARE BOLDED, all other negative.    Cardiovascular: Chest pain, palpitations, orthopnea, LE edema  Constitutional: New chills, fevers. Fatigue/sweaty.  Resp: Dyspnea at rest, dyspnea on exertion, known chronic lung disease  Hematologic/lymphatic: Current systemic anticoagulation, hx of blood clots, new bleeding concerns.  Neurological: New seizures, syncope/presyncope     Physical  "Exam:  Vitals: /80   Pulse 72   Ht 1.803 m (5' 11\")   Wt 86.6 kg (191 lb)   BMI 26.64 kg/m     Wt Readings from Last 4 Encounters:   04/17/23 86.6 kg (191 lb)   03/24/23 87.3 kg (192 lb 7.4 oz)       GEN:  In general, this is a well nourished male in no acute distress on RA.  Patient ambulatory, accompanied by his wife.  C/V:  Regular rate and rhythm, no murmur, rub or gallop. No S3 or RV heave.   RESP: Respirations are unlabored. No use of accessory muscles. Clear to auscultation bilaterally without wheezing, rales, or rhonchi.  EXTREM: No LE edema.   NEURO: Alert and oriented, cooperative. Gait not assessed.     Recent Lab Results:    CBC RESULTS:  Lab Results   Component Value Date    WBC 5.5 03/24/2023    RBC 6.24 (H) 03/24/2023    HGB 18.4 (H) 03/24/2023    HCT 51.4 03/24/2023    MCV 82 03/24/2023    MCH 29.5 03/24/2023    MCHC 35.8 03/24/2023    RDW 11.9 03/24/2023     03/24/2023       BMP RESULTS:  Lab Results   Component Value Date     03/24/2023    POTASSIUM 4.2 03/24/2023    CHLORIDE 99 03/24/2023    CO2 21 (L) 03/24/2023    ANIONGAP 16 (H) 03/24/2023     (H) 03/24/2023    BUN 13.9 03/24/2023    CR 0.91 03/24/2023    GFRESTIMATED >90 03/24/2023    DMITRIY 10.2 (H) 03/24/2023        Additional pertinent testing:    Echo 3/24/23  Interpretation Summary     Left ventricular systolic function is normal. The visual ejection fraction is  60-65%.  The right ventricle is normal in structure, function and size.  There is trace tricuspid regurgitation. There is a small filamentous mobile  echodensity below the tricuspid valve, suspect torn chordae tendineae (image  18).  Trivial anterior pericardial effusion. There are no echocardiographic  indications of cardiac tamponade/hemodynamic significance.  The inferior vena cava was normal in size with preserved respiratory  variability.     There are no prior studies available for comparison      30 total minutes was spent today including chart " review, precharting, history and exam, patient education, post visit documentation, and reviewing studies as outlined above.       Janel Oliveira PA-C  [facility]  Pager [phone]        Thank you for allowing me to participate in the care of your patient.      Sincerely,     KYRA Edouard     [facility]  cc:   No referring provider defined for this encounter.

## 2023-05-06 ENCOUNTER — HEALTH MAINTENANCE LETTER (OUTPATIENT)
Age: 58
End: 2023-05-06

## 2023-09-07 NOTE — PROGRESS NOTES
"CARDIOLOGY VISIT    REASON FOR VISIT: Paroxysmal A-fib    SUBJECTIVE:  58-year-old male seen for paroxysmal A-fib.    April 2023 he presented with A-fib, symptoms of dizziness and dyspnea.  He was cardioverted in the ED. Echo showed EF 60%, normal RV, no valve disease.    He has not had any further symptoms since last spring.  He does Al[employer] running, running about 3000 miles per year, or roughly 90/week.  He is back to his regular running at his usual pace.  He feels very good during this.  He denies any palpitations similar to his A-fib episode.  He thinks heart rate runs around 50 at rest, he does not check it regularly.    MEDICATIONS:  Current Outpatient Medications   Medication    order for DME     No current facility-administered medications for this visit.       ALLERGIES:  No Known Allergies    REVIEW OF SYSTEMS:  Constitutional:  No weight loss, fever, chills  HEENT:  Eyes:  No visual loss, blurred vision, double vision or yellow sclerae. No hearing loss, sneezing, congestion, runny nose or sore throat.  Skin:  No rash or itching.  Cardiovascular: per HPI  Respiratory: per HPI  GI:  No anorexia, nausea, vomiting or diarrhea. No abdominal pain or blood.  :  No dysurea, hematuria  Neurologic:  No headache, paralysis, ataxia, numbness or tingling in the extremities. No change in bowel or bladder control.  Musculoskeletal:  No muscle pain  Hematologic:  No bleeding or bruising.  Lymphatics:  No enlarged nodes. No history of splenectomy.  Endocrine:  No reports of sweating, cold or heat intolerance. No polyuria or polydipsia.  Allergies:  No history of asthma, hives, eczema or rhinitis.    PHYSICAL EXAM:  BP (!) 142/92 (BP Location: Right arm, Patient Position: Sitting, Cuff Size: Adult Large)   Pulse 95   Ht 1.803 m (5' 11\")   Wt 84.8 kg (187 lb)   SpO2 98%   BMI 26.08 kg/m    Constitutional: awake, alert, no distress  Eyes: PERRL, sclera nonicteric  ENT: trachea midline  Respiratory: Lungs " clear  Cardiovascular: Regular rate and rhythm, no murmurs  GI: nondistended, nontender, bowel sounds present  Lymph/Hematologic: no lymphadenopathy  Skin: dry, no rash  Musculoskeletal: good muscle tone, strength 5/5 in upper and lower extremities  Neurologic: no focal deficits  Neuropsychiatric: appropriate affact    DATA:  Lab: March 2023: Creatinine 0.9, TSH 1.7    ASSESSMENT:  58-year-old male seen for single episode of A-fib.  He has had no further A-fib symptoms.  He is back to running.  There is a slightly higher risk of A-fib with endurance athletes, this is probably his main risk factor.  He is off medications which seems reasonable for now.    RECOMMENDATIONS:  1.  Single episode of A-fib  -No further evaluation    Follow-up as needed.    Darryl Weldon MD  Cardiology - [facility]  Pager:  [phone]  Text Page  September 11, 2023

## 2023-09-11 ENCOUNTER — OFFICE VISIT (OUTPATIENT)
Dept: CARDIOLOGY | Facility: CLINIC | Age: 58
End: 2023-09-11
Attending: PHYSICIAN ASSISTANT
Payer: COMMERCIAL

## 2023-09-11 VITALS
HEART RATE: 95 BPM | BODY MASS INDEX: 26.18 KG/M2 | WEIGHT: 187 LBS | SYSTOLIC BLOOD PRESSURE: 142 MMHG | DIASTOLIC BLOOD PRESSURE: 92 MMHG | HEIGHT: 71 IN | OXYGEN SATURATION: 98 %

## 2023-09-11 DIAGNOSIS — I48.91 ATRIAL FIBRILLATION, UNSPECIFIED TYPE (H): ICD-10-CM

## 2023-09-11 PROCEDURE — 99213 OFFICE O/P EST LOW 20 MIN: CPT | Performed by: INTERNAL MEDICINE

## 2023-09-11 NOTE — LETTER
9/11/2023    Physician No Ref-Primary  No address on file    RE: Jf Bernardo       Dear Colleague,     I had the pleasure of seeing Jf Bernardo in the [facility].  CARDIOLOGY VISIT    REASON FOR VISIT: Paroxysmal A-fib    SUBJECTIVE:  58-year-old male seen for paroxysmal A-fib.    April 2023 he presented with A-fib, symptoms of dizziness and dyspnea.  He was cardioverted in the ED. Echo showed EF 60%, normal RV, no valve disease.    He has not had any further symptoms since last spring.  He does [employer] running, running about 3000 miles per year, or roughly 90/week.  He is back to his regular running at his usual pace.  He feels very good during this.  He denies any palpitations similar to his A-fib episode.  He thinks heart rate runs around 50 at rest, he does not check it regularly.    MEDICATIONS:  Current Outpatient Medications   Medication    order for DME     No current facility-administered medications for this visit.       ALLERGIES:  No Known Allergies    REVIEW OF SYSTEMS:  Constitutional:  No weight loss, fever, chills  HEENT:  Eyes:  No visual loss, blurred vision, double vision or yellow sclerae. No hearing loss, sneezing, congestion, runny nose or sore throat.  Skin:  No rash or itching.  Cardiovascular: per HPI  Respiratory: per HPI  GI:  No anorexia, nausea, vomiting or diarrhea. No abdominal pain or blood.  :  No dysurea, hematuria  Neurologic:  No headache, paralysis, ataxia, numbness or tingling in the extremities. No change in bowel or bladder control.  Musculoskeletal:  No muscle pain  Hematologic:  No bleeding or bruising.  Lymphatics:  No enlarged nodes. No history of splenectomy.  Endocrine:  No reports of sweating, cold or heat intolerance. No polyuria or polydipsia.  Allergies:  No history of asthma, hives, eczema or rhinitis.    PHYSICAL EXAM:  BP (!) 142/92 (BP Location: Right arm, Patient Position: Sitting, Cuff Size: Adult Large)   Pulse 95   Ht  "1.803 m (5' 11\")   Wt 84.8 kg (187 lb)   SpO2 98%   BMI 26.08 kg/m    Constitutional: awake, alert, no distress  Eyes: PERRL, sclera nonicteric  ENT: trachea midline  Respiratory: Lungs clear  Cardiovascular: Regular rate and rhythm, no murmurs  GI: nondistended, nontender, bowel sounds present  Lymph/Hematologic: no lymphadenopathy  Skin: dry, no rash  Musculoskeletal: good muscle tone, strength 5/5 in upper and lower extremities  Neurologic: no focal deficits  Neuropsychiatric: appropriate affact    DATA:  Lab: March 2023: Creatinine 0.9, TSH 1.7    ASSESSMENT:  58-year-old male seen for single episode of A-fib.  He has had no further A-fib symptoms.  He is back to running.  There is a slightly higher risk of A-fib with endurance athletes, this is probably his main risk factor.  He is off medications which seems reasonable for now.    RECOMMENDATIONS:  1.  Single episode of A-fib  -No further evaluation    Follow-up as needed.    Darryl Weldon MD  Cardiology - [facility]  Pager:  [phone]  Text Page  September 11, 2023        Thank you for allowing me to participate in the care of your patient.      Sincerely,     Darryl Weldon MD     [facility]  cc:   KYRA Edouard  [facility]  [address] [place] [id number]      "

## 2023-11-08 ENCOUNTER — OFFICE VISIT (OUTPATIENT)
Dept: FAMILY MEDICINE | Facility: CLINIC | Age: 58
End: 2023-11-08
Payer: COMMERCIAL

## 2023-11-08 VITALS
SYSTOLIC BLOOD PRESSURE: 122 MMHG | HEART RATE: 76 BPM | RESPIRATION RATE: 18 BRPM | HEIGHT: 71 IN | BODY MASS INDEX: 26.31 KG/M2 | OXYGEN SATURATION: 99 % | TEMPERATURE: 98.7 F | DIASTOLIC BLOOD PRESSURE: 70 MMHG | WEIGHT: 187.9 LBS

## 2023-11-08 DIAGNOSIS — Z12.5 SCREENING FOR PROSTATE CANCER: ICD-10-CM

## 2023-11-08 DIAGNOSIS — Z11.4 SCREENING FOR HIV (HUMAN IMMUNODEFICIENCY VIRUS): ICD-10-CM

## 2023-11-08 DIAGNOSIS — I48.91 ATRIAL FIBRILLATION, UNSPECIFIED TYPE (H): ICD-10-CM

## 2023-11-08 DIAGNOSIS — Z00.00 ROUTINE GENERAL MEDICAL EXAMINATION AT A HEALTH CARE FACILITY: Primary | ICD-10-CM

## 2023-11-08 DIAGNOSIS — Z13.1 SCREENING FOR DIABETES MELLITUS: ICD-10-CM

## 2023-11-08 DIAGNOSIS — Z12.11 SCREEN FOR COLON CANCER: ICD-10-CM

## 2023-11-08 DIAGNOSIS — Z11.59 NEED FOR HEPATITIS C SCREENING TEST: ICD-10-CM

## 2023-11-08 DIAGNOSIS — Z13.220 SCREENING FOR LIPID DISORDERS: ICD-10-CM

## 2023-11-08 LAB
ERYTHROCYTE [DISTWIDTH] IN BLOOD BY AUTOMATED COUNT: 12.1 % (ref 10–15)
HBA1C MFR BLD: 5.5 % (ref 0–5.6)
HCT VFR BLD AUTO: 46.4 % (ref 40–53)
HGB BLD-MCNC: 15.4 G/DL (ref 13.3–17.7)
MCH RBC QN AUTO: 29.7 PG (ref 26.5–33)
MCHC RBC AUTO-ENTMCNC: 33.2 G/DL (ref 31.5–36.5)
MCV RBC AUTO: 89 FL (ref 78–100)
PLATELET # BLD AUTO: 134 10E3/UL (ref 150–450)
RBC # BLD AUTO: 5.19 10E6/UL (ref 4.4–5.9)
WBC # BLD AUTO: 4.6 10E3/UL (ref 4–11)

## 2023-11-08 PROCEDURE — 86803 HEPATITIS C AB TEST: CPT | Performed by: NURSE PRACTITIONER

## 2023-11-08 PROCEDURE — G0103 PSA SCREENING: HCPCS | Performed by: NURSE PRACTITIONER

## 2023-11-08 PROCEDURE — 80061 LIPID PANEL: CPT | Performed by: NURSE PRACTITIONER

## 2023-11-08 PROCEDURE — 36415 COLL VENOUS BLD VENIPUNCTURE: CPT | Performed by: NURSE PRACTITIONER

## 2023-11-08 PROCEDURE — 85027 COMPLETE CBC AUTOMATED: CPT | Performed by: NURSE PRACTITIONER

## 2023-11-08 PROCEDURE — 99386 PREV VISIT NEW AGE 40-64: CPT | Performed by: NURSE PRACTITIONER

## 2023-11-08 PROCEDURE — 83036 HEMOGLOBIN GLYCOSYLATED A1C: CPT | Performed by: NURSE PRACTITIONER

## 2023-11-08 PROCEDURE — 87389 HIV-1 AG W/HIV-1&-2 AB AG IA: CPT | Performed by: NURSE PRACTITIONER

## 2023-11-08 PROCEDURE — 80053 COMPREHEN METABOLIC PANEL: CPT | Performed by: NURSE PRACTITIONER

## 2023-11-08 SDOH — HEALTH STABILITY: PHYSICAL HEALTH: ON AVERAGE, HOW MANY DAYS PER WEEK DO YOU ENGAGE IN MODERATE TO STRENUOUS EXERCISE (LIKE A BRISK WALK)?: 7 DAYS

## 2023-11-08 ASSESSMENT — SOCIAL DETERMINANTS OF HEALTH (SDOH)
HOW OFTEN DO YOU GET TOGETHER WITH FRIENDS OR RELATIVES?: MORE THAN THREE TIMES A WEEK
HOW OFTEN DO YOU ATTENT MEETINGS OF THE CLUB OR ORGANIZATION YOU BELONG TO?: NEVER
DO YOU BELONG TO ANY CLUBS OR ORGANIZATIONS SUCH AS CHURCH GROUPS UNIONS, FRATERNAL OR ATHLETIC GROUPS, OR SCHOOL GROUPS?: NO
IN A TYPICAL WEEK, HOW MANY TIMES DO YOU TALK ON THE PHONE WITH FAMILY, FRIENDS, OR NEIGHBORS?: MORE THAN THREE TIMES A WEEK

## 2023-11-08 ASSESSMENT — LIFESTYLE VARIABLES
SKIP TO QUESTIONS 9-10: 1
HOW OFTEN DO YOU HAVE SIX OR MORE DRINKS ON ONE OCCASION: NEVER
HOW OFTEN DO YOU HAVE A DRINK CONTAINING ALCOHOL: NEVER
AUDIT-C TOTAL SCORE: 0
HOW MANY STANDARD DRINKS CONTAINING ALCOHOL DO YOU HAVE ON A TYPICAL DAY: PATIENT DOES NOT DRINK

## 2023-11-08 ASSESSMENT — ENCOUNTER SYMPTOMS
SHORTNESS OF BREATH: 0
MYALGIAS: 0
CONSTIPATION: 0
EYE PAIN: 0
HEADACHES: 0
PALPITATIONS: 0
DIZZINESS: 0
HEMATURIA: 0
HEMATOCHEZIA: 0
DYSURIA: 1
JOINT SWELLING: 0
SORE THROAT: 0
NAUSEA: 0
HEARTBURN: 0
CHILLS: 0
WEAKNESS: 0
ABDOMINAL PAIN: 0
ARTHRALGIAS: 0
FEVER: 0
DIARRHEA: 0
COUGH: 0
FREQUENCY: 1
PARESTHESIAS: 0
NERVOUS/ANXIOUS: 0

## 2023-11-08 ASSESSMENT — PAIN SCALES - GENERAL: PAINLEVEL: NO PAIN (0)

## 2023-11-08 NOTE — PROGRESS NOTES
"SUBJECTIVE:   CC: Clovis is an 58 year old who presents for preventative health visit.       11/8/2023     3:48 PM   Additional Questions   Roomed by Amaya MAYES       Healthy Habits:     Getting at least 3 servings of Calcium per day:  Yes    Bi-annual eye exam:  Yes    Dental care twice a year:  Yes    Sleep apnea or symptoms of sleep apnea:  None    Diet:  Regular (no restrictions)    Frequency of exercise:  6-7 days/week    Duration of exercise:  Greater than 60 minutes    Taking medications regularly:  Not Applicable    Medication side effects:  Not applicable    Additional concerns today:  Yes    Non healing elbow wound  Mole check  Had afib converted normal echo  Very active     Have you ever done Advance Care Planning? (For example, a Health Directive, POLST, or a discussion with a medical provider or your loved ones about your wishes): No, advance care planning information given to patient to review.  Patient plans to discuss their wishes with loved ones or provider.      Social History     Tobacco Use    Smoking status: Former     Types: Cigarettes    Smokeless tobacco: Former   Substance Use Topics    Alcohol use: Not Currently             11/8/2023     3:26 PM   Alcohol Use   Prescreen: >3 drinks/day or >7 drinks/week? Not Applicable       Last PSA: No results found for: \"PSA\"    Reviewed orders with patient. Reviewed health maintenance and updated orders accordingly - Yes  Lab work is in process  Labs reviewed in EPIC    Reviewed and updated as needed this visit by clinical staff   Tobacco  Allergies  Meds              Reviewed and updated as needed this visit by Provider     Meds             No past medical history on file.   No past surgical history on file.    Review of Systems   Constitutional:  Negative for chills and fever.   HENT:  Positive for hearing loss. Negative for congestion, ear pain and sore throat.    Eyes:  Negative for pain and visual disturbance.   Respiratory:  Negative for cough and " "shortness of breath.    Cardiovascular:  Negative for chest pain, palpitations and peripheral edema.   Gastrointestinal:  Negative for abdominal pain, constipation, diarrhea, heartburn, hematochezia and nausea.   Genitourinary:  Positive for dysuria and frequency. Negative for genital sores, hematuria and urgency.   Musculoskeletal:  Negative for arthralgias, joint swelling and myalgias.   Skin:  Negative for rash.   Neurological:  Negative for dizziness, weakness, headaches and paresthesias.   Psychiatric/Behavioral:  Negative for mood changes. The patient is not nervous/anxious.          OBJECTIVE:   /70 (BP Location: Right arm, Patient Position: Sitting, Cuff Size: Adult Large)   Pulse 76   Temp 98.7  F (37.1  C) (Oral)   Resp 18   Ht 1.803 m (5' 11\")   Wt 85.2 kg (187 lb 14.4 oz)   SpO2 99%   BMI 26.21 kg/m      Physical Exam  GENERAL: healthy, alert and no distress  EYES: Eyes grossly normal to inspection, PERRL and conjunctivae and sclerae normal  HENT: ear canals and TM's normal, nose and mouth without ulcers or lesions  NECK: no adenopathy, no asymmetry, masses, or scars and thyroid normal to palpation  RESP: lungs clear to auscultation - no rales, rhonchi or wheezes  CV: regular rate and rhythm, normal S1 S2, no S3 or S4, no murmur, click or rub, no peripheral edema and peripheral pulses strong  ABDOMEN: soft, nontender, no hepatosplenomegaly, no masses and bowel sounds normal  MS: no gross musculoskeletal defects noted, no edema  SKIN: no suspicious lesions or rashes    Diagnostic Test Results:  Labs reviewed in Epic    ASSESSMENT/PLAN:   Clovis was seen today for physical.    Diagnoses and all orders for this visit:    Routine general medical examination at a health care facility  -     Comprehensive metabolic panel (BMP + Alb, Alk Phos, ALT, AST, Total. Bili, TP); Future  -     CBC with platelets; Future  -     Comprehensive metabolic panel (BMP + Alb, Alk Phos, ALT, AST, Total. Bili, TP)  -   " "  CBC with platelets    Screen for colon cancer  -     Colonoscopy Screening  Referral; Future    Screening for HIV (human immunodeficiency virus)  -     HIV Antigen Antibody Combo; Future  -     HIV Antigen Antibody Combo    Need for hepatitis C screening test  -     Hepatitis C Screen Reflex to HCV RNA Quant and Genotype; Future  -     Hepatitis C Screen Reflex to HCV RNA Quant and Genotype    Screening for lipid disorders  -     Lipid panel reflex to direct LDL Non-fasting; Future  -     Lipid panel reflex to direct LDL Non-fasting    Screening for diabetes mellitus  -     Hemoglobin A1c; Future  -     Hemoglobin A1c    Screening for prostate cancer  -     PSA, screen; Future  -     PSA, screen    Atrial fibrillation, unspecified type (H)  - resolved, monitor   Other orders  -     REVIEW OF HEALTH MAINTENANCE PROTOCOL ORDERS  -     Cancel: COVID-19 12+ (2023-24) ([facility])        Patient has been advised of split billing requirements and indicates understanding: Yes      COUNSELING:   Reviewed preventive health counseling, as reflected in patient instructions       Regular exercise       Healthy diet/nutrition      BMI:   Estimated body mass index is 26.21 kg/m  as calculated from the following:    Height as of this encounter: 1.803 m (5' 11\").    Weight as of this encounter: 85.2 kg (187 lb 14.4 oz).   Weight management plan: Discussed healthy diet and exercise guidelines      He reports that he has quit smoking. His smoking use included cigarettes. He has quit using smokeless tobacco.            Mary Ann Bowens NP  [facility]  "

## 2023-11-09 ENCOUNTER — TELEPHONE (OUTPATIENT)
Dept: GASTROENTEROLOGY | Facility: CLINIC | Age: 58
End: 2023-11-09
Payer: COMMERCIAL

## 2023-11-09 LAB
ALBUMIN SERPL BCG-MCNC: 4.3 G/DL (ref 3.5–5.2)
ALP SERPL-CCNC: 77 U/L (ref 40–129)
ALT SERPL W P-5'-P-CCNC: 22 U/L (ref 0–70)
ANION GAP SERPL CALCULATED.3IONS-SCNC: 11 MMOL/L (ref 7–15)
AST SERPL W P-5'-P-CCNC: 32 U/L (ref 0–45)
BILIRUB SERPL-MCNC: 0.2 MG/DL
BUN SERPL-MCNC: 24.4 MG/DL (ref 8–23)
CALCIUM SERPL-MCNC: 9.6 MG/DL (ref 8.6–10)
CHLORIDE SERPL-SCNC: 104 MMOL/L (ref 98–107)
CHOLEST SERPL-MCNC: 211 MG/DL
CREAT SERPL-MCNC: 0.94 MG/DL (ref 0.67–1.17)
DEPRECATED HCO3 PLAS-SCNC: 26 MMOL/L (ref 22–29)
EGFRCR SERPLBLD CKD-EPI 2021: >90 ML/MIN/1.73M2
GLUCOSE SERPL-MCNC: 103 MG/DL (ref 70–99)
HCV AB SERPL QL IA: NONREACTIVE
HDLC SERPL-MCNC: 53 MG/DL
HIV 1+2 AB+HIV1 P24 AG SERPL QL IA: NONREACTIVE
LDLC SERPL CALC-MCNC: 139 MG/DL
NONHDLC SERPL-MCNC: 158 MG/DL
POTASSIUM SERPL-SCNC: 4.9 MMOL/L (ref 3.4–5.3)
PROT SERPL-MCNC: 6.5 G/DL (ref 6.4–8.3)
PSA SERPL DL<=0.01 NG/ML-MCNC: 1.54 NG/ML (ref 0–3.5)
SODIUM SERPL-SCNC: 141 MMOL/L (ref 135–145)
TRIGL SERPL-MCNC: 93 MG/DL

## 2023-11-09 NOTE — TELEPHONE ENCOUNTER
"Endoscopy Scheduling Screen    Have you had a positive Covid test in the last 14 days?  No    Are you active on MyChart?   Yes    What insurance is in the chart?  Other:  po     Ordering/Referring Provider: MARIAJOSE ARIAS    (If ordering provider performs procedure, schedule with ordering provider unless otherwise instructed. )    BMI: Estimated body mass index is 26.21 kg/m  as calculated from the following:    Height as of 11/8/23: 1.803 m (5' 11\").    Weight as of 11/8/23: 85.2 kg (187 lb 14.4 oz).     Sedation Ordered  moderate sedation.   If patient BMI > 50 do not schedule in [facility].    If patient BMI > 45 do not schedule at [facility].    Are you taking methadone or Suboxone?  No    Are you taking any prescription medications for pain 3 or more times per week?   No    Do you have a history of malignant hyperthermia or adverse reaction to anesthesia?  No    (Females) Are you currently pregnant?   No     Have you been diagnosed or told you have pulmonary hypertension?   No    Do you have an LVAD?  No    Have you been told you have moderate to severe sleep apnea?  No    Have you been told you have COPD, asthma, or any other lung disease?  No    Do you have any heart conditions?  Yes  - afib - 03/2023    In the past 6 months, have you had any hospitalizations for heart related issues including cardiomyopathy, heart attack, or stent placement?  No    Do you have any implantable devices in your body (pacemaker, ICD)?  No    Do you take nitroglycerine?  No    Have you ever had an organ transplant?   No    Have you ever had or are you awaiting a heart or lung transplant?   No    Have you had a stroke or transient ischemic attack (TIA aka \"mini stroke\" in the last 6 months?   No    Have you been diagnosed with or been told you have cirrhosis of the liver?   No    Are you currently on dialysis?   No    Do you need assistance transferring?   No    BMI: Estimated body mass index is 26.21 kg/m  as calculated from the " "following:    Height as of 11/8/23: 1.803 m (5' 11\").    Weight as of 11/8/23: 85.2 kg (187 lb 14.4 oz).     Is patients BMI > 40 and scheduling location [facility]?  No    Do you take an injectable medication for weight loss or diabetes (excluding insulin)?  No    Do you take the medication Naltrexone?  No    Do you take blood thinners?  No       Prep   Are you currently on dialysis or do you have chronic kidney disease?  No    Do you have a diagnosis of diabetes?  No    Do you have a diagnosis of cystic fibrosis (CF)?  No    On a regular basis do you go 3 -5 days between bowel movements?  No    BMI > 40?  No    Preferred Pharmacy:    [id number] - [place], [place] - [address]   [address] [address]  Phone: [phone] Fax: [phone]      Final Scheduling Details   Colonoscopy prep sent?  Standard MiraLAX    Procedure scheduled  Colonoscopy      ** pt will call back to schedule date/time after talking to wife about available dates.     Patient Reminders:   You will receive a call from a Nurse to review instructions and health history.  This assessment must be completed prior to your procedure.  Failure to complete the Nurse assessment may result in the procedure being cancelled.      On the day of your procedure, please designate an adult(s) who can drive you home stay with you for the next 24 hours. The medicines used in the exam will make you sleepy. You will not be able to drive.      You cannot take public transportation, ride share services, or non-medical taxi service without a responsible caregiver.  Medical transport services are allowed with the requirement that a responsible caregiver will receive you at your destination.  We require that drivers and caregivers are confirmed prior to your procedure.  "

## 2023-12-05 ENCOUNTER — MYC MEDICAL ADVICE (OUTPATIENT)
Dept: FAMILY MEDICINE | Facility: CLINIC | Age: 58
End: 2023-12-05
Payer: COMMERCIAL

## 2023-12-06 NOTE — TELEPHONE ENCOUNTER
Patient Quality Outreach    Patient is due for the following:   Colon Cancer Screening    Next Steps:   No follow up needed at this time.    Type of outreach:    Sent [employer] message.    Next Steps:  Reach out within 90 days via [employer].    Max number of attempts reached: No. Will try again in 90 days if patient still on fail list.    Questions for provider review:    None           Denia Hutchins, ZEE  Chart routed to Care Team.

## 2024-05-02 ENCOUNTER — MYC MEDICAL ADVICE (OUTPATIENT)
Dept: FAMILY MEDICINE | Facility: CLINIC | Age: 59
End: 2024-05-02
Payer: COMMERCIAL

## 2024-05-02 NOTE — TELEPHONE ENCOUNTER
Patient Quality Outreach    Patient is due for the following:   Colon Cancer Screening    Next Steps:   No follow up needed at this time.    Type of outreach:    Sent [employer] message.    Next Steps:  Reach out within 90 days via [employer].    Max number of attempts reached: No. Will try again in 90 days if patient still on fail list.    Questions for provider review:    None           Denia Hutchins, ZEE  Chart routed to Care Team.

## 2024-05-02 NOTE — TELEPHONE ENCOUNTER
Patient Quality Outreach    Patient is due for the following:   Depression  -  PHQ-9 needed    Next Steps:   Patient was assigned appropriate questionnaire to complete    Type of outreach:    Sent [employer] message.    Next Steps:  Reach out within 90 days via [employer].    Max number of attempts reached: No. Will try again in 90 days if patient still on fail list.    Questions for provider review:    None           Denia Hutchins CMA  Chart routed to Care Team.

## 2025-01-12 ENCOUNTER — HEALTH MAINTENANCE LETTER (OUTPATIENT)
Age: 60
End: 2025-01-12